# Patient Record
Sex: MALE | Race: WHITE | NOT HISPANIC OR LATINO | Employment: OTHER | URBAN - METROPOLITAN AREA
[De-identification: names, ages, dates, MRNs, and addresses within clinical notes are randomized per-mention and may not be internally consistent; named-entity substitution may affect disease eponyms.]

---

## 2021-01-15 ENCOUNTER — OFFICE VISIT (OUTPATIENT)
Dept: URBAN - METROPOLITAN AREA CLINIC 68 | Facility: CLINIC | Age: 76
End: 2021-01-15

## 2021-02-01 ENCOUNTER — OFFICE VISIT (OUTPATIENT)
Dept: URBAN - METROPOLITAN AREA SURGERY CENTER 12 | Facility: SURGERY CENTER | Age: 76
End: 2021-02-01

## 2021-02-02 ENCOUNTER — TELEPHONE ENCOUNTER (OUTPATIENT)
Dept: URBAN - METROPOLITAN AREA CLINIC 68 | Facility: CLINIC | Age: 76
End: 2021-02-02

## 2021-02-02 ENCOUNTER — LAB OUTSIDE AN ENCOUNTER (OUTPATIENT)
Dept: URBAN - METROPOLITAN AREA CLINIC 68 | Facility: CLINIC | Age: 76
End: 2021-02-02

## 2021-02-02 LAB
01: (no result)
01: (no result)

## 2021-02-03 ENCOUNTER — TELEPHONE ENCOUNTER (OUTPATIENT)
Dept: URBAN - METROPOLITAN AREA CLINIC 68 | Facility: CLINIC | Age: 76
End: 2021-02-03

## 2022-06-04 ENCOUNTER — TELEPHONE ENCOUNTER (OUTPATIENT)
Dept: URBAN - METROPOLITAN AREA CLINIC 68 | Facility: CLINIC | Age: 77
End: 2022-06-04

## 2022-06-04 RX ORDER — DEXLANSOPRAZOLE 60 MG/1
CAPSULE, DELAYED RELEASE ORAL DAILY
Qty: 21 | Refills: 0 | OUTPATIENT
Start: 2015-12-22 | End: 2016-01-12

## 2022-06-04 RX ORDER — FINASTERIDE 5 MG/1
FINASTERIDE( 5MG ORAL  DAILY ) INACTIVE -HX ENTRY TABLET, FILM COATED ORAL DAILY
OUTPATIENT
Start: 2018-08-17

## 2022-06-04 RX ORDER — NIZATIDINE 150 MG/1
NIZATIDINE( 150MG ORAL  DAILY ) INACTIVE -HX ENTRY CAPSULE ORAL DAILY
OUTPATIENT
Start: 2014-03-13

## 2022-06-04 RX ORDER — POLYETHYLENE GLYCOL 3350 17 G/DOSE
POWDER (GRAM) ORAL
Qty: 1 | Refills: 0 | OUTPATIENT
Start: 2012-10-31 | End: 2012-11-30

## 2022-06-04 RX ORDER — ROSUVASTATIN CALCIUM 10 MG
CRESTOR( 10MG ORAL  DAILY ) INACTIVE -HX ENTRY TABLET ORAL DAILY
OUTPATIENT
Start: 2017-06-09

## 2022-06-04 RX ORDER — ATORVASTATIN CALCIUM 20 MG/1
ATORVASTATIN CALCIUM( 20MG ORAL  DAILY ) INACTIVE -HX ENTRY TABLET, FILM COATED ORAL DAILY
OUTPATIENT
Start: 2018-08-17

## 2022-06-04 RX ORDER — TADALAFIL 5 MG/1
CIALIS( 5MG ORAL  ONE TABLET EVERY OTHER DAY ) INACTIVE -HX ENTRY TABLET, FILM COATED ORAL
OUTPATIENT
Start: 2014-08-12

## 2022-06-04 RX ORDER — NIZATIDINE 150 MG/1
CAPSULE ORAL DAILY
Qty: 90 | Refills: 90 | OUTPATIENT
Start: 2014-11-24 | End: 2015-04-08

## 2022-06-04 RX ORDER — OLMESARTAN MEDOXOMIL-HYDROCHLOROTHIAZIDE 25; 40 MG/1; MG/1
BENICAR HCT( 40-25MG ORAL  DAILY ) INACTIVE -HX ENTRY TABLET, FILM COATED ORAL DAILY
OUTPATIENT
Start: 2014-03-13

## 2022-06-04 RX ORDER — POLYETHYLENE GLYCOL 3350, SODIUM SULFATE, SODIUM CHLORIDE, POTASSIUM CHLORIDE, ASCORBIC ACID, SODIUM ASCORBATE 7.5-2.691G
KIT ORAL AS DIRECTED
Qty: 1 | Refills: 0 | OUTPATIENT
Start: 2012-10-31 | End: 2012-11-01

## 2022-06-04 RX ORDER — WARFARIN 5 MG/1
WARFARIN SODIUM( 5MG ORAL ALTERNATING 5MG AND 2.5MG DAILY ) INACTIVE -HX ENTRY TABLET ORAL DAILY
OUTPATIENT
Start: 2014-03-13

## 2022-06-05 ENCOUNTER — TELEPHONE ENCOUNTER (OUTPATIENT)
Dept: URBAN - METROPOLITAN AREA CLINIC 68 | Facility: CLINIC | Age: 77
End: 2022-06-05

## 2022-06-05 RX ORDER — OMEGA-3S/DHA/EPA/FISH OIL/D3 300MG-1000
FISH OIL BURP-LESS( 1000MG ORAL  DAILY ) ACTIVE -HX ENTRY CAPSULE ORAL DAILY
Status: ACTIVE | COMMUNITY
Start: 2021-01-15

## 2022-06-05 RX ORDER — FLECAINIDE ACETATE 50 MG/1
FLECAINIDE ACETATE( 50MG ORAL  TWICE A DAY ) ACTIVE -HX ENTRY TABLET ORAL TWICE A DAY
Status: ACTIVE | COMMUNITY
Start: 2021-01-15

## 2022-06-05 RX ORDER — FAMOTIDINE 20 MG/1
TABLET, FILM COATED ORAL DAILY
Qty: 90 | Refills: 90 | Status: ACTIVE | COMMUNITY
Start: 2016-02-08

## 2022-06-05 RX ORDER — OLMESARTAN MEDOXOMIL 20 MG/1
BENICAR( 20MG ORAL  BID ) ACTIVE -HX ENTRY TABLET, FILM COATED ORAL BID
Status: ACTIVE | COMMUNITY
Start: 2021-01-15

## 2022-06-05 RX ORDER — DUTASTERIDE 0.5 MG/1
DUTASTERIDE( 0.5MG ORAL 1 EVERY OTHER DAY ) ACTIVE -HX ENTRY CAPSULE, LIQUID FILLED ORAL EVERY OTHER DAY
Status: ACTIVE | COMMUNITY
Start: 2021-01-15

## 2022-06-05 RX ORDER — OMEPRAZOLE 20 MG/1
CAPSULE, DELAYED RELEASE ORAL
Qty: 180 | Refills: 180 | Status: ACTIVE | COMMUNITY
Start: 2017-06-02

## 2022-06-05 RX ORDER — ROSUVASTATIN CALCIUM 40 MG/1
CRESTOR( 40MG ORAL 1 DAILY ) ACTIVE -HX ENTRY TABLET, FILM COATED ORAL DAILY
Status: ACTIVE | COMMUNITY
Start: 2021-01-15

## 2022-06-25 ENCOUNTER — TELEPHONE ENCOUNTER (OUTPATIENT)
Age: 77
End: 2022-06-25

## 2022-06-25 RX ORDER — DEXLANSOPRAZOLE 60 MG/1
CAPSULE, DELAYED RELEASE ORAL DAILY
Qty: 21 | Refills: 0 | OUTPATIENT
Start: 2015-12-22 | End: 2016-01-12

## 2022-06-25 RX ORDER — TADALAFIL 5 MG/1
CIALIS( 5MG ORAL  ONE TABLET EVERY OTHER DAY ) INACTIVE -HX ENTRY TABLET, FILM COATED ORAL
OUTPATIENT
Start: 2014-08-12

## 2022-06-25 RX ORDER — ROSUVASTATIN CALCIUM 10 MG
CRESTOR( 10MG ORAL  DAILY ) INACTIVE -HX ENTRY TABLET ORAL DAILY
OUTPATIENT
Start: 2017-06-09

## 2022-06-25 RX ORDER — ATORVASTATIN CALCIUM 20 MG/1
ATORVASTATIN CALCIUM( 20MG ORAL  DAILY ) INACTIVE -HX ENTRY TABLET, FILM COATED ORAL DAILY
OUTPATIENT
Start: 2018-08-17

## 2022-06-25 RX ORDER — OLMESARTAN/HYDROCHLOROTHIAZIDE 40 MG-25MG
BENICAR HCT( 40-25MG ORAL  DAILY ) INACTIVE -HX ENTRY TABLET ORAL DAILY
OUTPATIENT
Start: 2014-03-13

## 2022-06-25 RX ORDER — FINASTERIDE 5 MG/1
FINASTERIDE( 5MG ORAL  DAILY ) INACTIVE -HX ENTRY TABLET, FILM COATED ORAL DAILY
OUTPATIENT
Start: 2018-08-17

## 2022-06-25 RX ORDER — WARFARIN 5 MG/1
WARFARIN SODIUM( 5MG ORAL ALTERNATING 5MG AND 2.5MG DAILY ) INACTIVE -HX ENTRY TABLET ORAL DAILY
OUTPATIENT
Start: 2014-03-13

## 2022-06-25 RX ORDER — NIZATIDINE 150 MG/1
NIZATIDINE( 150MG ORAL  DAILY ) INACTIVE -HX ENTRY CAPSULE ORAL DAILY
OUTPATIENT
Start: 2014-03-13

## 2022-06-25 RX ORDER — LORATADINE 5 MG
TABLET,CHEWABLE ORAL
Qty: 1 | Refills: 0 | OUTPATIENT
Start: 2012-10-31 | End: 2012-11-30

## 2022-06-25 RX ORDER — POLYETHYLENE GLYCOL 3350, SODIUM SULFATE, SODIUM CHLORIDE, POTASSIUM CHLORIDE, ASCORBIC ACID, SODIUM ASCORBATE 7.5-2.691G
KIT ORAL AS DIRECTED
Qty: 1 | Refills: 0 | OUTPATIENT
Start: 2012-10-31 | End: 2012-11-01

## 2022-06-25 RX ORDER — NIZATIDINE 150 MG/1
CAPSULE ORAL DAILY
Qty: 90 | Refills: 90 | OUTPATIENT
Start: 2014-11-24 | End: 2015-04-08

## 2022-06-26 ENCOUNTER — TELEPHONE ENCOUNTER (OUTPATIENT)
Age: 77
End: 2022-06-26

## 2022-06-26 RX ORDER — FLECAINIDE ACETATE 50 MG/1
FLECAINIDE ACETATE( 50MG ORAL  TWICE A DAY ) ACTIVE -HX ENTRY TABLET ORAL TWICE A DAY
Status: ACTIVE | COMMUNITY
Start: 2021-01-15

## 2022-06-26 RX ORDER — OMEPRAZOLE 20 MG/1
CAPSULE, DELAYED RELEASE ORAL
Qty: 180 | Refills: 180 | Status: ACTIVE | COMMUNITY
Start: 2017-06-02

## 2022-06-26 RX ORDER — OMEGA-3S/DHA/EPA/FISH OIL/D3 300MG-1000
FISH OIL BURP-LESS( 1000MG ORAL  DAILY ) ACTIVE -HX ENTRY CAPSULE ORAL DAILY
Status: ACTIVE | COMMUNITY
Start: 2021-01-15

## 2022-06-26 RX ORDER — FAMOTIDINE 20 MG/1
TABLET ORAL DAILY
Qty: 90 | Refills: 90 | Status: ACTIVE | COMMUNITY
Start: 2016-02-08

## 2022-06-26 RX ORDER — ROSUVASTATIN CALCIUM 40 MG/1
CRESTOR( 40MG ORAL 1 DAILY ) ACTIVE -HX ENTRY TABLET, FILM COATED ORAL DAILY
Status: ACTIVE | COMMUNITY
Start: 2021-01-15

## 2022-06-26 RX ORDER — DUTASTERIDE 0.5 MG/1
DUTASTERIDE( 0.5MG ORAL 1 EVERY OTHER DAY ) ACTIVE -HX ENTRY CAPSULE, LIQUID FILLED ORAL EVERY OTHER DAY
Status: ACTIVE | COMMUNITY
Start: 2021-01-15

## 2022-06-26 RX ORDER — OLMESARTAN MEDOXOMIL 20 MG/1
BENICAR( 20MG ORAL  BID ) ACTIVE -HX ENTRY TABLET, FILM COATED ORAL BID
Status: ACTIVE | COMMUNITY
Start: 2021-01-15

## 2023-03-29 ENCOUNTER — OFFICE VISIT (OUTPATIENT)
Dept: URBAN - METROPOLITAN AREA CLINIC 68 | Facility: CLINIC | Age: 78
End: 2023-03-29

## 2023-03-29 ENCOUNTER — TELEPHONE ENCOUNTER (OUTPATIENT)
Dept: URBAN - METROPOLITAN AREA CLINIC 68 | Facility: CLINIC | Age: 78
End: 2023-03-29

## 2023-03-29 RX ORDER — FLECAINIDE ACETATE 50 MG/1
FLECAINIDE ACETATE( 50MG ORAL  TWICE A DAY ) ACTIVE -HX ENTRY TABLET ORAL TWICE A DAY
Status: ACTIVE | COMMUNITY
Start: 2021-01-15

## 2023-03-29 RX ORDER — OMEGA-3S/DHA/EPA/FISH OIL/D3 300MG-1000
FISH OIL BURP-LESS( 1000MG ORAL  DAILY ) ACTIVE -HX ENTRY CAPSULE ORAL DAILY
Status: ACTIVE | COMMUNITY
Start: 2021-01-15

## 2023-03-29 RX ORDER — OLMESARTAN MEDOXOMIL 20 MG/1
BENICAR( 20MG ORAL  BID ) ACTIVE -HX ENTRY TABLET, FILM COATED ORAL BID
Status: ACTIVE | COMMUNITY
Start: 2021-01-15

## 2023-03-29 RX ORDER — FAMOTIDINE 20 MG/1
TABLET ORAL DAILY
Qty: 90 | Refills: 90 | Status: ACTIVE | COMMUNITY
Start: 2016-02-08

## 2023-03-29 RX ORDER — OMEPRAZOLE 20 MG/1
CAPSULE, DELAYED RELEASE ORAL
Qty: 180 | Refills: 180 | Status: ACTIVE | COMMUNITY
Start: 2017-06-02

## 2023-03-29 RX ORDER — DUTASTERIDE 0.5 MG/1
DUTASTERIDE( 0.5MG ORAL 1 EVERY OTHER DAY ) ACTIVE -HX ENTRY CAPSULE, LIQUID FILLED ORAL EVERY OTHER DAY
Status: ACTIVE | COMMUNITY
Start: 2021-01-15

## 2023-03-29 RX ORDER — ROSUVASTATIN CALCIUM 40 MG/1
CRESTOR( 40MG ORAL 1 DAILY ) ACTIVE -HX ENTRY TABLET, FILM COATED ORAL DAILY
Status: ACTIVE | COMMUNITY
Start: 2021-01-15

## 2023-04-11 ENCOUNTER — OFFICE VISIT (OUTPATIENT)
Dept: URBAN - METROPOLITAN AREA CLINIC 68 | Facility: CLINIC | Age: 78
End: 2023-04-11

## 2023-04-11 RX ORDER — OLMESARTAN MEDOXOMIL 20 MG/1
BENICAR( 20MG ORAL  BID ) ACTIVE -HX ENTRY TABLET, FILM COATED ORAL BID
Status: ACTIVE | COMMUNITY
Start: 2021-01-15

## 2023-04-11 RX ORDER — OMEPRAZOLE 40 MG/1
1 CAPSULE CAPSULE, DELAYED RELEASE ORAL ONCE A DAY
Status: ACTIVE | COMMUNITY

## 2023-04-11 RX ORDER — ROSUVASTATIN CALCIUM 40 MG/1
CRESTOR( 40MG ORAL 1 DAILY ) ACTIVE -HX ENTRY TABLET, FILM COATED ORAL DAILY
Status: ACTIVE | COMMUNITY
Start: 2021-01-15

## 2023-04-11 RX ORDER — SOD SULF/POT CHLORIDE/MAG SULF 1.479 G
24 TABLETS AS DIRECTED TABLET ORAL ONCE
Qty: 24 TABLET | Refills: 0 | OUTPATIENT
Start: 2023-04-11 | End: 2023-04-12

## 2023-04-11 RX ORDER — OMEGA-3S/DHA/EPA/FISH OIL/D3 300MG-1000
FISH OIL BURP-LESS( 1000MG ORAL  DAILY ) ACTIVE -HX ENTRY CAPSULE ORAL DAILY
Status: ACTIVE | COMMUNITY
Start: 2021-01-15

## 2023-04-11 RX ORDER — FLECAINIDE ACETATE 50 MG/1
FLECAINIDE ACETATE( 50MG ORAL  TWICE A DAY ) ACTIVE -HX ENTRY TABLET ORAL TWICE A DAY
Status: ACTIVE | COMMUNITY
Start: 2021-01-15

## 2023-04-11 RX ORDER — OMEPRAZOLE 40 MG/1
1 CAPSULE CAPSULE, DELAYED RELEASE ORAL ONCE A DAY
OUTPATIENT

## 2023-04-11 RX ORDER — DUTASTERIDE 0.5 MG/1
DUTASTERIDE( 0.5MG ORAL 1 EVERY OTHER DAY ) ACTIVE -HX ENTRY CAPSULE, LIQUID FILLED ORAL EVERY OTHER DAY
Status: ACTIVE | COMMUNITY
Start: 2021-01-15

## 2023-04-11 NOTE — HPI-MIGRATED HPI
General : Got a note for FU stopped omeprazole and tried pepcid and LPR symptoms recurred throat secretion and irritation NO alarm symptoms  Hx barretts last egd 2 yrs ago  Last colon unknown,  pt to check his records Regular cardiac eval  pacer for bradycardia  no AICD

## 2023-05-09 ENCOUNTER — OFFICE VISIT (OUTPATIENT)
Dept: URBAN - METROPOLITAN AREA SURGERY CENTER 12 | Facility: SURGERY CENTER | Age: 78
End: 2023-05-09

## 2023-05-20 ENCOUNTER — TELEPHONE ENCOUNTER (OUTPATIENT)
Dept: URBAN - METROPOLITAN AREA CLINIC 68 | Facility: CLINIC | Age: 78
End: 2023-05-20

## 2023-05-23 ENCOUNTER — OFFICE VISIT (OUTPATIENT)
Dept: URBAN - METROPOLITAN AREA SURGERY CENTER 12 | Facility: SURGERY CENTER | Age: 78
End: 2023-05-23

## 2024-04-10 ENCOUNTER — OV EP (OUTPATIENT)
Dept: URBAN - METROPOLITAN AREA CLINIC 68 | Facility: CLINIC | Age: 79
End: 2024-04-10

## 2024-04-10 RX ORDER — OMEGA-3S/DHA/EPA/FISH OIL/D3 300MG-1000
FISH OIL BURP-LESS( 1000MG ORAL  DAILY ) ACTIVE -HX ENTRY CAPSULE ORAL DAILY
Status: ACTIVE | COMMUNITY
Start: 2021-01-15

## 2024-04-10 RX ORDER — OMEPRAZOLE 40 MG/1
1 CAPSULE CAPSULE, DELAYED RELEASE ORAL ONCE A DAY
Status: ACTIVE | COMMUNITY

## 2024-04-10 RX ORDER — OLMESARTAN MEDOXOMIL 20 MG/1
BENICAR( 20MG ORAL  BID ) ACTIVE -HX ENTRY TABLET, FILM COATED ORAL BID
Status: ACTIVE | COMMUNITY
Start: 2021-01-15

## 2024-04-10 RX ORDER — FLECAINIDE ACETATE 50 MG/1
FLECAINIDE ACETATE( 50MG ORAL  TWICE A DAY ) ACTIVE -HX ENTRY TABLET ORAL TWICE A DAY
Status: ACTIVE | COMMUNITY
Start: 2021-01-15

## 2024-04-10 RX ORDER — DUTASTERIDE 0.5 MG/1
DUTASTERIDE( 0.5MG ORAL 1 EVERY OTHER DAY ) ACTIVE -HX ENTRY CAPSULE, LIQUID FILLED ORAL EVERY OTHER DAY
Status: ACTIVE | COMMUNITY
Start: 2021-01-15

## 2024-04-10 RX ORDER — ROSUVASTATIN CALCIUM 40 MG/1
CRESTOR( 40MG ORAL 1 DAILY ) ACTIVE -HX ENTRY TABLET, FILM COATED ORAL DAILY
Status: ACTIVE | COMMUNITY
Start: 2021-01-15

## 2024-04-23 ENCOUNTER — OV EP (OUTPATIENT)
Dept: URBAN - METROPOLITAN AREA CLINIC 68 | Facility: CLINIC | Age: 79
End: 2024-04-23

## 2024-10-30 ENCOUNTER — OFFICE VISIT (OUTPATIENT)
Dept: URBAN - METROPOLITAN AREA CLINIC 68 | Facility: CLINIC | Age: 79
End: 2024-10-30

## 2024-10-30 ENCOUNTER — DASHBOARD ENCOUNTERS (OUTPATIENT)
Age: 79
End: 2024-10-30

## 2024-10-30 ENCOUNTER — LAB OUTSIDE AN ENCOUNTER (OUTPATIENT)
Dept: URBAN - METROPOLITAN AREA CLINIC 68 | Facility: CLINIC | Age: 79
End: 2024-10-30

## 2024-10-30 VITALS
WEIGHT: 190.2 LBS | SYSTOLIC BLOOD PRESSURE: 126 MMHG | HEART RATE: 67 BPM | TEMPERATURE: 97.7 F | OXYGEN SATURATION: 99 % | DIASTOLIC BLOOD PRESSURE: 80 MMHG | BODY MASS INDEX: 28.82 KG/M2 | HEIGHT: 68 IN

## 2024-10-30 RX ORDER — OMEGA-3S/DHA/EPA/FISH OIL/D3 300MG-1000
FISH OIL BURP-LESS( 1000MG ORAL  DAILY ) ACTIVE -HX ENTRY CAPSULE ORAL DAILY
Status: ACTIVE | COMMUNITY
Start: 2021-01-15

## 2024-10-30 RX ORDER — OMEPRAZOLE 40 MG/1
1 CAPSULE CAPSULE, DELAYED RELEASE ORAL ONCE A DAY
Status: ACTIVE | COMMUNITY

## 2024-10-30 RX ORDER — OMEPRAZOLE 40 MG/1
1 CAPSULE CAPSULE, DELAYED RELEASE ORAL ONCE A DAY
OUTPATIENT

## 2024-10-30 RX ORDER — ROSUVASTATIN CALCIUM 40 MG/1
CRESTOR( 40MG ORAL 1 DAILY ) ACTIVE -HX ENTRY TABLET, FILM COATED ORAL DAILY
Status: ACTIVE | COMMUNITY
Start: 2021-01-15

## 2024-10-30 RX ORDER — FLECAINIDE ACETATE 50 MG/1
FLECAINIDE ACETATE( 50MG ORAL  TWICE A DAY ) ACTIVE -HX ENTRY TABLET ORAL TWICE A DAY
Status: ACTIVE | COMMUNITY
Start: 2021-01-15

## 2024-10-30 RX ORDER — OLMESARTAN MEDOXOMIL 20 MG/1
BENICAR( 20MG ORAL  BID ) ACTIVE -HX ENTRY TABLET, FILM COATED ORAL BID
Status: ACTIVE | COMMUNITY
Start: 2021-01-15

## 2024-10-30 RX ORDER — DUTASTERIDE 0.5 MG/1
DUTASTERIDE( 0.5MG ORAL 1 EVERY OTHER DAY ) ACTIVE -HX ENTRY CAPSULE, LIQUID FILLED ORAL EVERY OTHER DAY
Status: ACTIVE | COMMUNITY
Start: 2021-01-15

## 2024-10-30 NOTE — HPI-MIGRATED HPI
10/30 pt developed arthritis then on prednisone 10 gm daily and pt gained 10 lbs and  physical therapist,  physical therapy rec follow up here  Dr Aaron re-evaluation  pt complains of low ABD pressure no bleeding or UGI symptoms and bulging abdomen   SP EGD 2023  reflux and neg dysphagia, hx Barretts in the past  SP colon 2023  3 polyps  also getting thyroid gland nodule evaluation pending scan and possible needle   PRIOR General : Got a note for FU stopped omeprazole and tried pepcid and LPR symptoms recurred throat secretion and irritation NO alarm symptoms  Hx barretts last egd 2 yrs ago  Last colon unknown,  pt to check his records Regular cardiac eval  pacer for bradycardia  no AICD

## 2024-11-14 ENCOUNTER — LAB OUTSIDE AN ENCOUNTER (OUTPATIENT)
Dept: URBAN - METROPOLITAN AREA CLINIC 68 | Facility: CLINIC | Age: 79
End: 2024-11-14

## 2024-11-19 ENCOUNTER — TELEPHONE ENCOUNTER (OUTPATIENT)
Dept: URBAN - METROPOLITAN AREA CLINIC 68 | Facility: CLINIC | Age: 79
End: 2024-11-19